# Patient Record
Sex: FEMALE | URBAN - METROPOLITAN AREA
[De-identification: names, ages, dates, MRNs, and addresses within clinical notes are randomized per-mention and may not be internally consistent; named-entity substitution may affect disease eponyms.]

---

## 2017-03-14 ENCOUNTER — HOSPITAL SCAN (OUTPATIENT)
Dept: TRANSPLANT | Age: 52
End: 2017-03-14

## 2017-03-14 DIAGNOSIS — Z01.818 KIDNEY LIVING DONOR EVALUATION, PRE-OP: Primary | ICD-10-CM

## 2017-05-10 DIAGNOSIS — Z00.5 TRANSPLANT DONOR EVALUATION: Primary | ICD-10-CM

## 2020-07-02 ENCOUNTER — OFFICE VISIT (OUTPATIENT)
Dept: SURGERY | Facility: CLINIC | Age: 55
End: 2020-07-02

## 2020-07-02 VITALS
DIASTOLIC BLOOD PRESSURE: 78 MMHG | RESPIRATION RATE: 17 BRPM | BODY MASS INDEX: 25.66 KG/M2 | WEIGHT: 154 LBS | TEMPERATURE: 97 F | HEART RATE: 83 BPM | HEIGHT: 65 IN | OXYGEN SATURATION: 98 % | SYSTOLIC BLOOD PRESSURE: 117 MMHG

## 2020-07-02 DIAGNOSIS — M51.37 DEGENERATIVE DISC DISEASE AT L5-S1 LEVEL: Primary | ICD-10-CM

## 2020-07-02 PROCEDURE — 99203 OFFICE O/P NEW LOW 30 MIN: CPT | Performed by: SURGERY

## 2020-07-02 RX ORDER — CYCLOBENZAPRINE HCL 10 MG
TABLET ORAL
COMMUNITY
Start: 2020-05-12

## 2020-07-02 RX ORDER — LEVOTHYROXINE SODIUM 0.1 MG/1
TABLET ORAL
COMMUNITY
Start: 2020-06-22

## 2020-07-02 RX ORDER — BUSPIRONE HYDROCHLORIDE 10 MG/1
10 TABLET ORAL 3 TIMES DAILY
COMMUNITY
Start: 2020-06-18

## 2020-07-02 RX ORDER — DIPHENHYDRAMINE HCL 50 MG
50 CAPSULE ORAL EVERY 6 HOURS PRN
COMMUNITY

## 2020-07-02 RX ORDER — ERGOCALCIFEROL 1.25 MG/1
50000 CAPSULE ORAL WEEKLY
COMMUNITY

## 2020-07-02 RX ORDER — MAG HYDROX/ALUMINUM HYD/SIMETH 400-400-40
SUSPENSION, ORAL (FINAL DOSE FORM) ORAL 2 TIMES DAILY
COMMUNITY

## 2020-07-02 RX ORDER — DULOXETIN HYDROCHLORIDE 20 MG/1
CAPSULE, DELAYED RELEASE ORAL
COMMUNITY
Start: 2020-06-18

## 2020-07-02 RX ORDER — CHOLECALCIFEROL (VITAMIN D3) 125 MCG
5 CAPSULE ORAL
COMMUNITY

## 2020-07-02 RX ORDER — FLUOXETINE HYDROCHLORIDE 40 MG/1
40 CAPSULE ORAL EVERY MORNING
COMMUNITY
Start: 2020-06-22

## 2020-07-02 NOTE — PROGRESS NOTES
7/2/2020    Patient Information  Vandana Burciaga  [address]  [place] [address]  1965  [phone] (home)         Chief Complaint  Chief Complaint   Patient presents with   • Consult     Surgery Consult       HPI  Patient is a 55-year-old white female with degenerative disc disease in which Dr. Quan Multani, her neurosurgeon, has asked for my opinion of an anterior approach for fissure of L5-S1.  Significantly this patient has had a lower midline incision for harvesting her kidney to give her  for transplant.  She has done well since his surgery, performed a few years ago      Past Medical History  Past Medical History:   Diagnosis Date   • Celiac disease    • Hyperthyroidism with Hashimoto disease        Past Surgical History  Past Surgical History:   Procedure Laterality Date   • NEPHRECTOMY PARTIAL Left     donated   • TUBAL ABDOMINAL LIGATION         Family History  Family History   Problem Relation Age of Onset   • No Known Problems Mother    • Heart disease Father    • Cancer Maternal Aunt    • Hypertension Maternal Grandmother    • Diabetes Maternal Grandmother        Social History  Social History     Socioeconomic History   • Marital status:      Spouse name: Not on file   • Number of children: Not on file   • Years of education: Not on file   • Highest education level: Not on file   Tobacco Use   • Smoking status: Never Smoker   • Smokeless tobacco: Never Used   Substance and Sexual Activity   • Alcohol use: Never     Frequency: Never   • Drug use: Defer   • Sexual activity: Defer       Current Medications  Current Outpatient Medications   Medication Sig Dispense Refill   • B Complex Vitamins (VITAMIN-B COMPLEX PO) Take  by mouth.     • COLLAGEN-BORON-HYALURONIC ACID PO Take  by mouth.     • diphenhydrAMINE (BENADRYL) 50 MG capsule Take 50 mg by mouth Every 6 (Six) Hours As Needed for Itching.     • melatonin 5 MG tablet tablet Take 5 mg by mouth.     • Saw Palmetto 450 MG capsule  "Take  by mouth 2 (two) times a day.     • vitamin D (ERGOCALCIFEROL) 1.25 MG (66630 UT) capsule capsule Take 50,000 Units by mouth 1 (One) Time Per Week.     • busPIRone (BUSPAR) 10 MG tablet Take 10 mg by mouth 3 (Three) Times a Day.     • cyclobenzaprine (FLEXERIL) 10 MG tablet TAKE 1 TABLET BY MOUTH THREE TIMES A DAILY AS NEEDED.     • DULoxetine (CYMBALTA) 20 MG capsule TAKE ONE CAPSULE BY MOUTH DAILY FOR TWO WEEKS THEN STOP     • FLUoxetine (PROzac) 40 MG capsule Take 40 mg by mouth Every Morning.     • levothyroxine (SYNTHROID, LEVOTHROID) 100 MCG tablet TAKE 1 TABLET BY MOUTH EVERY DAY IN THE MORNING ON EMPTY STOMACH       No current facility-administered medications for this visit.        Allergies  Patient has no known allergies.      Review of Systems    The Past medical history, family history, social history, medication list, allergies, and Review of Systems has been reviewed and confirmed.    General: weight gain 20lbs  Integumentary: rash  Eyes: glasses, dry  ENT: negative  Respiratory: negative  Gastrointestinal: constipation  Cardiovascular: negative  Neurological: numbness and headaches  Psychiatric: anxiety and depression  Hematologic/Lymphatic: negative  Genitourinary: incontinence  Musculoskeletal: painful joints, back pain and joint stiffness  Endocrine: history of thyroid problem and underactive thyroid  Breasts: negative      Vitals:   Vitals:    07/02/20 1101   BP: 117/78   Pulse: 83   Resp: 17   Temp: 97 °F (36.1 °C)   SpO2: 98%     Body mass index is 25.63 kg/m².      07/02/20  1101   Weight: 69.9 kg (154 lb)     165.1 cm (65\")    Physical Exam  Physical Exam   Constitutional: She is oriented to person, place, and time. She appears well-developed and well-nourished. No distress.   HENT:   Head: Normocephalic.   Right Ear: External ear normal.   Left Ear: External ear normal.   Nose: Nose normal.   Mouth/Throat: Oropharynx is clear and moist.   Eyes: Conjunctivae and EOM are normal. Right " eye exhibits no discharge. Left eye exhibits no discharge.   Neck: Normal range of motion. No JVD present. No tracheal deviation present. No thyromegaly present.   Cardiovascular: Normal rate, regular rhythm, normal heart sounds and intact distal pulses. Exam reveals no gallop and no friction rub.   No murmur heard.  Pulmonary/Chest: Effort normal and breath sounds normal. No stridor. No respiratory distress. She has no wheezes. She has no rales. She exhibits no tenderness.   Abdominal: Soft. Bowel sounds are normal. She exhibits no distension and no mass. There is no tenderness. There is no rebound and no guarding. No hernia.   Genitourinary: Rectal exam shows guaiac negative stool.   Musculoskeletal: Normal range of motion. She exhibits no edema, tenderness or deformity.   Lymphadenopathy:     She has no cervical adenopathy.   Neurological: She is alert and oriented to person, place, and time. She has normal reflexes. She displays normal reflexes. No cranial nerve deficit. She exhibits normal muscle tone. Coordination normal.   Skin: Skin is warm and dry. No rash noted. She is not diaphoretic. No erythema. No pallor.   Psychiatric: She has a normal mood and affect. Her behavior is normal. Judgment and thought content normal.       Assessment   Degenerative disc disease L5-S1    Plan  Anterior approach for fusion of L5-S1.    I have explained in detail that I may find that the anterior approach is not possible because of the previous surgery she has had.  I have explained to her that we would enter the abdomen to the left off of the midline to see if we can get adequate exposure.  I have informed her that we may not be able to get the exposure needed because of the previous surgery.  She is well aware of the pros and cons of an anterior approach versus a posterior approach.  She is willing for us to try the anterior approach first, with the knowledge that that may not be possible.            This document signed by  Kaleb Conn MD July 2, 2020 11:03